# Patient Record
Sex: MALE | Race: WHITE | NOT HISPANIC OR LATINO | Employment: OTHER | URBAN - NONMETROPOLITAN AREA
[De-identification: names, ages, dates, MRNs, and addresses within clinical notes are randomized per-mention and may not be internally consistent; named-entity substitution may affect disease eponyms.]

---

## 2017-03-21 ENCOUNTER — OFFICE VISIT (OUTPATIENT)
Dept: UROLOGY | Facility: CLINIC | Age: 72
End: 2017-03-21

## 2017-03-21 DIAGNOSIS — N42.9 DISORDER OF PROSTATE: ICD-10-CM

## 2017-03-21 DIAGNOSIS — N28.1 RENAL CYST: Primary | ICD-10-CM

## 2017-03-21 PROCEDURE — 99203 OFFICE O/P NEW LOW 30 MIN: CPT | Performed by: UROLOGY

## 2017-03-21 RX ORDER — PANTOPRAZOLE SODIUM 40 MG/1
TABLET, DELAYED RELEASE ORAL
COMMUNITY
Start: 2017-02-27

## 2017-03-21 RX ORDER — NITROGLYCERIN 0.4 MG/1
TABLET SUBLINGUAL
Refills: 2 | COMMUNITY
Start: 2017-01-24

## 2017-03-21 RX ORDER — ALPRAZOLAM 0.5 MG/1
0.5 TABLET ORAL AS NEEDED
Refills: 2 | COMMUNITY
Start: 2017-01-24

## 2017-03-21 RX ORDER — LISINOPRIL AND HYDROCHLOROTHIAZIDE 25; 20 MG/1; MG/1
TABLET ORAL
COMMUNITY
Start: 2017-01-08

## 2017-03-21 RX ORDER — SIMVASTATIN 40 MG
TABLET ORAL
COMMUNITY
Start: 2017-03-17

## 2017-03-21 RX ORDER — GLIPIZIDE 5 MG/1
TABLET ORAL
Refills: 1 | COMMUNITY
Start: 2017-02-21 | End: 2021-04-16

## 2017-03-21 NOTE — PROGRESS NOTES
Chief Complaint:          Chief Complaint   Patient presents with   • Renal Cyst       HPI:   71 y.o. male.    HPI  Pt was referred for renal cyst that has enlarged.  Pt has bilateral cysts.  Pt's creat was 1.3      Past Medical History:        Past Medical History   Diagnosis Date   • CAD (coronary artery disease)    • Chronic kidney disease    • Diabetes mellitus    • Hypertension          Current Meds:     Current Outpatient Prescriptions   Medication Sig Dispense Refill   • ALPRAZolam (XANAX) 0.5 MG tablet   2   • glipiZIDE (GLUCOTROL) 5 MG tablet   1   • lisinopril-hydrochlorothiazide (PRINZIDE,ZESTORETIC) 20-25 MG per tablet      • NITROSTAT 0.4 MG SL tablet   2   • pantoprazole (PROTONIX) 40 MG EC tablet      • simvastatin (ZOCOR) 40 MG tablet        No current facility-administered medications for this visit.         Allergies:      No Known Allergies     Past Surgical History:     Past Surgical History   Procedure Laterality Date   • Shoulder surgery     • Angioplasty     • Appendectomy     • Cholecystectomy           Social History:     Social History     Social History   • Marital status: Unknown     Spouse name: N/A   • Number of children: N/A   • Years of education: N/A     Occupational History   • Not on file.     Social History Main Topics   • Smoking status: Former Smoker   • Smokeless tobacco: Not on file   • Alcohol use No   • Drug use: No   • Sexual activity: Not on file     Other Topics Concern   • Not on file     Social History Narrative   • No narrative on file       Family History:     Family History   Problem Relation Age of Onset   • Heart disease Father    • Hypertension Father    • Cancer Father    • Cancer Mother    • Hypertension Mother    • Heart disease Mother        Review of Systems:     Review of Systems    Physical Exam:     Physical Exam   Constitutional: He is oriented to person, place, and time.   HENT:   Head: Normocephalic and atraumatic.   Right Ear: External ear normal.    Left Ear: External ear normal.   Nose: Nose normal.   Mouth/Throat: Oropharynx is clear and moist.   Eyes: Conjunctivae and EOM are normal. Pupils are equal, round, and reactive to light.   Neck: Normal range of motion. Neck supple. No thyromegaly present.   Cardiovascular: Normal rate, regular rhythm, normal heart sounds and intact distal pulses.    No murmur heard.  Pulmonary/Chest: Effort normal and breath sounds normal. No respiratory distress. He has no wheezes. He has no rales. He exhibits no tenderness.   Abdominal: Soft. Bowel sounds are normal. He exhibits no distension and no mass. There is no tenderness. No hernia.   Genitourinary: Rectum normal, prostate normal and penis normal.   Musculoskeletal: Normal range of motion. He exhibits no edema or tenderness.   Lymphadenopathy:     He has no cervical adenopathy.   Neurological: He is alert and oriented to person, place, and time. No cranial nerve deficit. He exhibits normal muscle tone. Coordination normal.   Skin: Skin is warm. No rash noted.   Psychiatric: He has a normal mood and affect. His behavior is normal. Judgment and thought content normal.   Nursing note and vitals reviewed.      Procedure:     No notes on file      Assessment:     Encounter Diagnoses   Name Primary?   • Renal cyst Yes   • Disorder of prostate      Orders Placed This Encounter   Procedures   • US Renal Bilateral     Standing Status:   Future     Standing Expiration Date:   3/21/2018     Order Specific Question:   Reason for Exam:     Answer:   bilateral renal cysts.   • PSA     Standing Status:   Future     Standing Expiration Date:   3/21/2018       Plan:   I would like the imaging regarding the renal cysts to be done at [facility] because I can watch the size and nature of how the cysts change.    Counseling was given to patient for the following topics impressions. and the interim medical history and current results were reviewed.  A treatment plan with follow-up was made.  Total time of the encounter was 35 minutes and 35 minutes were spent discussing Renal cyst [N28.1] face-to-face.        This document has been electronically signed by Francisco Garcia MD March 21, 2017 4:40 PM

## 2017-04-07 ENCOUNTER — APPOINTMENT (OUTPATIENT)
Dept: ULTRASOUND IMAGING | Facility: HOSPITAL | Age: 72
End: 2017-04-07
Attending: UROLOGY

## 2017-04-10 ENCOUNTER — HOSPITAL ENCOUNTER (OUTPATIENT)
Dept: ULTRASOUND IMAGING | Facility: HOSPITAL | Age: 72
Discharge: HOME | End: 2017-04-10
Attending: UROLOGY | Admitting: UROLOGY

## 2017-04-10 DIAGNOSIS — N28.1 RENAL CYST: ICD-10-CM

## 2017-04-10 PROCEDURE — 76775 US EXAM ABDO BACK WALL LIM: CPT

## 2017-04-10 PROCEDURE — 76770 US EXAM ABDO BACK WALL COMP: CPT | Performed by: RADIOLOGY

## 2019-04-03 ENCOUNTER — OFFICE VISIT (OUTPATIENT)
Dept: UROLOGY | Facility: CLINIC | Age: 74
End: 2019-04-03

## 2019-04-03 VITALS — WEIGHT: 235 LBS | BODY MASS INDEX: 31.14 KG/M2 | HEIGHT: 73 IN

## 2019-04-03 DIAGNOSIS — N28.1 RENAL CYST: ICD-10-CM

## 2019-04-03 DIAGNOSIS — N40.1 BENIGN PROSTATIC HYPERPLASIA WITH WEAK URINARY STREAM: ICD-10-CM

## 2019-04-03 DIAGNOSIS — R35.0 FREQUENCY OF URINATION: Primary | ICD-10-CM

## 2019-04-03 DIAGNOSIS — R39.12 BENIGN PROSTATIC HYPERPLASIA WITH WEAK URINARY STREAM: ICD-10-CM

## 2019-04-03 LAB
BILIRUB BLD-MCNC: ABNORMAL MG/DL
CLARITY, POC: CLEAR
COLOR UR: YELLOW
GLUCOSE UR STRIP-MCNC: ABNORMAL MG/DL
KETONES UR QL: NEGATIVE
LEUKOCYTE EST, POC: NEGATIVE
NITRITE UR-MCNC: NEGATIVE MG/ML
PH UR: 5 [PH] (ref 5–8)
PROT UR STRIP-MCNC: NEGATIVE MG/DL
PSA SERPL-MCNC: 0.89 NG/ML (ref 0–4)
RBC # UR STRIP: NEGATIVE /UL
SP GR UR: 1.03 (ref 1–1.03)
UROBILINOGEN UR QL: NORMAL

## 2019-04-03 PROCEDURE — 99214 OFFICE O/P EST MOD 30 MIN: CPT | Performed by: UROLOGY

## 2019-04-03 PROCEDURE — 84153 ASSAY OF PSA TOTAL: CPT | Performed by: UROLOGY

## 2019-04-03 PROCEDURE — 81003 URINALYSIS AUTO W/O SCOPE: CPT | Performed by: UROLOGY

## 2019-04-03 PROCEDURE — 36415 COLL VENOUS BLD VENIPUNCTURE: CPT | Performed by: UROLOGY

## 2019-04-03 NOTE — PROGRESS NOTES
Chief Complaint:          Renal cysts.    HPI:   73 y.o. male.  Pt was referred back to us because his primary physician has noticed his renal cysts on ultrasound have increased in size.  He does not have any back pain.  He has moderate obstructive urinary symptoms by is urinary symptom questionnaire IPSS score of 12 see chart below.  Pt's psa is normal  HPI      Past Medical History:        Past Medical History:   Diagnosis Date   • CAD (coronary artery disease)    • Chronic kidney disease    • Diabetes mellitus (CMS/HCC)    • Hypertension          Current Meds:     Current Outpatient Medications   Medication Sig Dispense Refill   • ALPRAZolam (XANAX) 0.5 MG tablet   2   • glipiZIDE (GLUCOTROL) 5 MG tablet   1   • lisinopril-hydrochlorothiazide (PRINZIDE,ZESTORETIC) 20-25 MG per tablet      • NITROSTAT 0.4 MG SL tablet   2   • pantoprazole (PROTONIX) 40 MG EC tablet      • simvastatin (ZOCOR) 40 MG tablet        No current facility-administered medications for this visit.         Allergies:      No Known Allergies     Past Surgical History:     Past Surgical History:   Procedure Laterality Date   • ANGIOPLASTY     • APPENDECTOMY     • CHOLECYSTECTOMY     • SHOULDER SURGERY           Social History:     Social History     Socioeconomic History   • Marital status: Unknown     Spouse name: Not on file   • Number of children: Not on file   • Years of education: Not on file   • Highest education level: Not on file   Tobacco Use   • Smoking status: Former Smoker   • Smokeless tobacco: Never Used   Substance and Sexual Activity   • Alcohol use: No   • Drug use: No       Family History:     Family History   Problem Relation Age of Onset   • Heart disease Father    • Hypertension Father    • Cancer Father    • Cancer Mother    • Hypertension Mother    • Heart disease Mother        Review of Systems:     Review of Systems   Constitutional: Positive for fatigue.   HENT: Negative for sinus pressure and sinus pain.    Eyes:  Negative for pain and redness.   Respiratory: Negative for chest tightness.    Cardiovascular: Negative for chest pain.   Gastrointestinal: Negative for abdominal pain, constipation and nausea.   Endocrine: Negative for heat intolerance.   Genitourinary: Negative for frequency, penile pain and testicular pain.   Musculoskeletal: Negative for back pain.   Skin: Negative for rash.   Allergic/Immunologic: Negative for food allergies.   Neurological: Negative for headaches.   Hematological: Bruises/bleeds easily.   Psychiatric/Behavioral: The patient is not nervous/anxious.        IPSS Questionnaire (AUA-7):  Over the past month…    1)  Incomplete Emptying  How often have you had a sensation of not emptying your bladder?  3 - About half the time   2)  Frequency  How often have you had to urinate less than every two hours? 0 - Not at all   3)  Intermittency  How often have you found you stopped and started again several times when you urinated?  4 - More than half the time   4) Urgency  How often have you found it difficult to postpone urination?  2 - Less than half the time   5) Weak Stream  How often have you had a weak urinary stream?  2 - Less than half the time   6) Straining  How often have you had to push or strain to begin urination?  0 - Not at all   7) Nocturia  How many times did you typically get up at night to urinate?  1 - 1 time   Total Score:  12       Quality of life due to urinary symptoms:  If you were to spend the rest of your life with your urinary condition the way it is now, how would you feel about that? 2-Mostly Satisfied   Urine Leakage (Incontinence) 1-Mild (A few drops a day, no pad use)       I have reviewed the follow portions of the patient's history and confirmed they are accurate today:  allergies, current medications, past family history, past medical history, past social history, past surgical history, problem list and ROS  Physical Exam:     Physical Exam   Constitutional: He is  "oriented to person, place, and time.   HENT:   Head: Normocephalic and atraumatic.   Right Ear: External ear normal.   Left Ear: External ear normal.   Nose: Nose normal.   Mouth/Throat: Oropharynx is clear and moist.   Eyes: Conjunctivae and EOM are normal. Pupils are equal, round, and reactive to light.   Neck: Normal range of motion. Neck supple. No thyromegaly present.   Cardiovascular: Normal rate, regular rhythm, normal heart sounds and intact distal pulses.   No murmur heard.  Pulmonary/Chest: Effort normal and breath sounds normal. No respiratory distress. He has no wheezes. He has no rales. He exhibits no tenderness.   Abdominal: Soft. Bowel sounds are normal. He exhibits no distension and no mass. There is no tenderness. No hernia.   Genitourinary: Rectum normal, prostate normal and penis normal.   Musculoskeletal: Normal range of motion. He exhibits no edema or tenderness.   Lymphadenopathy:     He has no cervical adenopathy.   Neurological: He is alert and oriented to person, place, and time. No cranial nerve deficit. He exhibits normal muscle tone. Coordination normal.   Skin: Skin is warm. No rash noted.   Psychiatric: He has a normal mood and affect. His behavior is normal. Judgment and thought content normal.   Nursing note and vitals reviewed.      Ht 185.4 cm (73\")   Wt 107 kg (235 lb)   BMI 31.00 kg/m²    Procedure:         Assessment:     Encounter Diagnoses   Name Primary?   • Frequency of urination Yes   • Benign prostatic hyperplasia with weak urinary stream    • Renal cyst      Orders Placed This Encounter   Procedures   • CT Abdomen With & Without Contrast     Standing Status:   Future     Standing Expiration Date:   4/2/2020     Scheduling Instructions:      Abdomen Only!     Order Specific Question:   Will Oral Contrast be needed for this procedure?     Answer:   No   • PSA DIAGNOSTIC     Standing Status:   Future     Standing Expiration Date:   4/3/2022   • POC Urinalysis Dipstick, " Automated       Plan:   Pt has moderate obstructive symptoms of BPH will check a psa  He has increasing size of his renal cysts on ultrasound so we will check a ct scan with and without contrast.  Will see him back in a month.    Patient's Body mass index is 31 kg/m². BMI is above normal parameters. Recommendations include: educational material.      Counseling was given to patient for the following topics impressions as follows: renal cyst and bph. The interim medical history and current results were reviewed.  A treatment plan with follow-up was made for Frequency of urination [R35.0].  This document has been electronically signed by Francisco Garcia MD April 3, 2019 10:28 AM

## 2019-05-21 ENCOUNTER — HOSPITAL ENCOUNTER (OUTPATIENT)
Dept: CT IMAGING | Facility: HOSPITAL | Age: 74
Discharge: HOME | End: 2019-05-21
Admitting: UROLOGY

## 2019-05-21 DIAGNOSIS — N28.1 RENAL CYST: ICD-10-CM

## 2019-05-21 PROCEDURE — 74150 CT ABDOMEN W/O CONTRAST: CPT | Performed by: RADIOLOGY

## 2019-05-21 PROCEDURE — 74150 CT ABDOMEN W/O CONTRAST: CPT

## 2019-09-24 ENCOUNTER — OFFICE VISIT (OUTPATIENT)
Dept: UROLOGY | Facility: CLINIC | Age: 74
End: 2019-09-24

## 2019-09-24 VITALS
HEIGHT: 73 IN | BODY MASS INDEX: 32.02 KG/M2 | WEIGHT: 241.6 LBS | SYSTOLIC BLOOD PRESSURE: 140 MMHG | DIASTOLIC BLOOD PRESSURE: 82 MMHG

## 2019-09-24 DIAGNOSIS — N28.1 RENAL CYST: Primary | ICD-10-CM

## 2019-09-24 PROCEDURE — 99213 OFFICE O/P EST LOW 20 MIN: CPT | Performed by: UROLOGY

## 2019-09-24 NOTE — PROGRESS NOTES
Chief Complaint:          Renal cyst.    HPI:   74 y.o. male.  Pt has normal renal function with creat of 0,85  Pt when given the option of not having the contrast but he did not understand that enhancement was key to evaluating the cyst.  HPI      Past Medical History:        Past Medical History:   Diagnosis Date   • CAD (coronary artery disease)    • Chronic kidney disease    • Diabetes mellitus (CMS/HCC)    • Hypertension          Current Meds:     Current Outpatient Medications   Medication Sig Dispense Refill   • ALPRAZolam (XANAX) 0.5 MG tablet   2   • glipiZIDE (GLUCOTROL) 5 MG tablet   1   • lisinopril-hydrochlorothiazide (PRINZIDE,ZESTORETIC) 20-25 MG per tablet      • NITROSTAT 0.4 MG SL tablet   2   • pantoprazole (PROTONIX) 40 MG EC tablet      • simvastatin (ZOCOR) 40 MG tablet        No current facility-administered medications for this visit.         Allergies:      No Known Allergies     Past Surgical History:     Past Surgical History:   Procedure Laterality Date   • ANGIOPLASTY     • APPENDECTOMY     • CHOLECYSTECTOMY     • SHOULDER SURGERY           Social History:     Social History     Socioeconomic History   • Marital status: Unknown     Spouse name: Not on file   • Number of children: Not on file   • Years of education: Not on file   • Highest education level: Not on file   Tobacco Use   • Smoking status: Former Smoker   • Smokeless tobacco: Never Used   Substance and Sexual Activity   • Alcohol use: No   • Drug use: No       Family History:     Family History   Problem Relation Age of Onset   • Heart disease Father    • Hypertension Father    • Cancer Father    • Cancer Mother    • Hypertension Mother    • Heart disease Mother        Review of Systems:     Review of Systems   Constitutional: Positive for fatigue.   HENT: Negative for sinus pressure and sinus pain.    Eyes: Negative for pain and redness.   Respiratory: Negative for chest tightness.    Cardiovascular: Negative for chest pain.  "  Gastrointestinal: Positive for diarrhea. Negative for abdominal pain and constipation.   Endocrine: Negative for heat intolerance.   Genitourinary: Negative for dysuria, frequency and hematuria.   Musculoskeletal: Positive for back pain.   Skin: Negative for rash.   Allergic/Immunologic: Negative for food allergies.   Neurological: Negative for headaches.   Hematological: Bruises/bleeds easily.   Psychiatric/Behavioral: The patient is not nervous/anxious.        IPSS Questionnaire (AUA-7):  Over the past month…    1)  Incomplete Emptying  How often have you had a sensation of not emptying your bladder?  0 - Not at all   2)  Frequency  How often have you had to urinate less than every two hours? 1 - Less than 1 time in 5   3)  Intermittency  How often have you found you stopped and started again several times when you urinated?  1 - Less than 1 time in 5   4) Urgency  How often have you found it difficult to postpone urination?  3 - About half the time   5) Weak Stream  How often have you had a weak urinary stream?  0 - Not at all   6) Straining  How often have you had to push or strain to begin urination?  0 - Not at all   7) Nocturia  How many times did you typically get up at night to urinate?  1 - 1 time   Total Score:  6       Quality of life due to urinary symptoms:  If you were to spend the rest of your life with your urinary condition the way it is now, how would you feel about that? 2-Mostly Satisfied   Urine Leakage (Incontinence) 1-Mild (A few drops a day, no pad use)       I have reviewed the follow portions of the patient's history and confirmed they are accurate today:  allergies, current medications, past family history, past medical history, past social history, past surgical history, problem list and ROS  Physical Exam:     Physical Exam    /82 (BP Location: Left arm, Patient Position: Sitting, Cuff Size: Adult)   Ht 185.4 cm (73\")   Wt 110 kg (241 lb 9.6 oz)   BMI 31.88 kg/m²  "   Procedure:         Assessment:     Encounter Diagnosis   Name Primary?   • Renal cyst Yes       No orders of the defined types were placed in this encounter.      Patient reports that he is not currently experiencing any symptoms of urinary incontinence.      Plan:   Will have pt get renal mass protocol ct in six months.    Patient's Body mass index is 31.88 kg/m². BMI is above normal parameters. Recommendations include: educational material.      Smoking Cessation Counseling:  Former smoker.  Patient does not currently use any tobacco products.    Quit 1989    Counseling was given to patient for the following topics diagnostic results including: ct scan. The interim medical history and current results were reviewed.  A treatment plan with follow-up was made for Renal cyst [N28.1].   This document has been electronically signed by Francisco Garcia MD September 24, 2019 12:28 PM

## 2020-01-14 ENCOUNTER — HOSPITAL ENCOUNTER (OUTPATIENT)
Dept: GENERAL RADIOLOGY | Facility: HOSPITAL | Age: 75
Discharge: HOME | End: 2020-01-14
Admitting: FAMILY MEDICINE

## 2020-01-14 DIAGNOSIS — M54.50 LUMBAR PAIN: ICD-10-CM

## 2020-01-14 PROCEDURE — 72110 X-RAY EXAM L-2 SPINE 4/>VWS: CPT

## 2020-01-31 ENCOUNTER — TRANSCRIBE ORDERS (OUTPATIENT)
Dept: ADMINISTRATIVE | Facility: HOSPITAL | Age: 75
End: 2020-01-31

## 2020-01-31 DIAGNOSIS — M51.36 DEGENERATION, INTERVERTEBRAL DISC, LUMBAR: Primary | ICD-10-CM

## 2020-02-04 ENCOUNTER — HOSPITAL ENCOUNTER (OUTPATIENT)
Dept: MRI IMAGING | Facility: HOSPITAL | Age: 75
Discharge: HOME | End: 2020-02-04
Admitting: FAMILY MEDICINE

## 2020-02-04 DIAGNOSIS — M51.36 DEGENERATION, INTERVERTEBRAL DISC, LUMBAR: ICD-10-CM

## 2020-02-04 PROCEDURE — 72148 MRI LUMBAR SPINE W/O DYE: CPT | Performed by: RADIOLOGY

## 2020-02-04 PROCEDURE — 72148 MRI LUMBAR SPINE W/O DYE: CPT

## 2020-03-18 ENCOUNTER — HOSPITAL ENCOUNTER (OUTPATIENT)
Dept: CT IMAGING | Facility: HOSPITAL | Age: 75
Discharge: HOME | End: 2020-03-18
Admitting: UROLOGY

## 2020-03-18 DIAGNOSIS — N28.1 RENAL CYST: ICD-10-CM

## 2020-03-18 LAB — CREAT BLDA-MCNC: 1.4 MG/DL (ref 0.6–1.3)

## 2020-03-18 PROCEDURE — 74178 CT ABD&PLV WO CNTR FLWD CNTR: CPT | Performed by: RADIOLOGY

## 2020-03-18 PROCEDURE — 82565 ASSAY OF CREATININE: CPT

## 2020-03-18 PROCEDURE — 74178 CT ABD&PLV WO CNTR FLWD CNTR: CPT

## 2020-03-18 PROCEDURE — 0 IOVERSOL 68 % SOLUTION: Performed by: UROLOGY

## 2020-03-18 RX ADMIN — IOVERSOL 50 ML: 678 INJECTION INTRA-ARTERIAL; INTRAVENOUS at 08:46

## 2021-02-23 ENCOUNTER — OFFICE VISIT (OUTPATIENT)
Dept: UROLOGY | Facility: CLINIC | Age: 76
End: 2021-02-23

## 2021-02-23 VITALS — WEIGHT: 241 LBS | TEMPERATURE: 97.4 F | BODY MASS INDEX: 36.53 KG/M2 | HEIGHT: 68 IN

## 2021-02-23 DIAGNOSIS — N28.1 COMPLEX RENAL CYST: Primary | ICD-10-CM

## 2021-02-23 DIAGNOSIS — N40.1 BENIGN PROSTATIC HYPERPLASIA WITH LOWER URINARY TRACT SYMPTOMS, SYMPTOM DETAILS UNSPECIFIED: ICD-10-CM

## 2021-02-23 LAB — PSA SERPL-MCNC: 0.98 NG/ML (ref 0–4)

## 2021-02-23 PROCEDURE — 36415 COLL VENOUS BLD VENIPUNCTURE: CPT | Performed by: UROLOGY

## 2021-02-23 PROCEDURE — 84153 ASSAY OF PSA TOTAL: CPT | Performed by: UROLOGY

## 2021-02-23 PROCEDURE — 99214 OFFICE O/P EST MOD 30 MIN: CPT | Performed by: UROLOGY

## 2021-02-23 NOTE — PROGRESS NOTES
Chief Complaint:          Renal cyst.    HPI:   75 y.o. male.  Pt has normal renal function and was referred for evaluation of renal cyst.  His last study did not have contrast because he did not understand the function of it.  I recommended that he have a ct scan renal mass protocol within 6 months.  HPI      Past Medical History:        Past Medical History:   Diagnosis Date   • CAD (coronary artery disease)    • Chronic kidney disease    • Diabetes mellitus (CMS/HCC)    • Hypertension          Current Meds:     Current Outpatient Medications   Medication Sig Dispense Refill   • ALPRAZolam (XANAX) 0.5 MG tablet   2   • glipiZIDE (GLUCOTROL) 5 MG tablet   1   • lisinopril-hydrochlorothiazide (PRINZIDE,ZESTORETIC) 20-25 MG per tablet      • NITROSTAT 0.4 MG SL tablet   2   • pantoprazole (PROTONIX) 40 MG EC tablet      • simvastatin (ZOCOR) 40 MG tablet        No current facility-administered medications for this visit.         Allergies:      No Known Allergies     Past Surgical History:     Past Surgical History:   Procedure Laterality Date   • ANGIOPLASTY     • APPENDECTOMY     • CHOLECYSTECTOMY     • SHOULDER SURGERY           Social History:     Social History     Socioeconomic History   • Marital status:      Spouse name: Not on file   • Number of children: Not on file   • Years of education: Not on file   • Highest education level: Not on file   Tobacco Use   • Smoking status: Former Smoker   • Smokeless tobacco: Never Used   Substance and Sexual Activity   • Alcohol use: No   • Drug use: No       Family History:     Family History   Problem Relation Age of Onset   • Heart disease Father    • Hypertension Father    • Cancer Father    • Cancer Mother    • Hypertension Mother    • Heart disease Mother        Review of Systems:     Review of Systems   HENT: Negative for facial swelling and sinus pain.    Respiratory: Negative for choking and stridor.    Gastrointestinal: Negative for constipation.    Genitourinary: Negative for frequency and penile swelling.   Musculoskeletal: Negative for gait problem.   Neurological: Negative for numbness.   Psychiatric/Behavioral: Negative for agitation and decreased concentration.       IPSS Questionnaire (AUA-7):  Over the past month…    1)  Incomplete Emptying  How often have you had a sensation of not emptying your bladder?  0 - Not at all   2)  Frequency  How often have you had to urinate less than every two hours? 1 - Less than 1 time in 5   3)  Intermittency  How often have you found you stopped and started again several times when you urinated?  1 - Less than 1 time in 5   4) Urgency  How often have you found it difficult to postpone urination?  3 - About half the time   5) Weak Stream  How often have you had a weak urinary stream?  0 - Not at all   6) Straining  How often have you had to push or strain to begin urination?  0 - Not at all   7) Nocturia  How many times did you typically get up at night to urinate?  1 - 1 time   Total Score:  6       Quality of life due to urinary symptoms:  If you were to spend the rest of your life with your urinary condition the way it is now, how would you feel about that? 2-Mostly Satisfied   Urine Leakage (Incontinence) 1-Mild (A few drops a day, no pad use)       I have reviewed the follow portions of the patient's history and confirmed they are accurate today:  allergies, current medications, past family history, past medical history, past social history, past surgical history, problem list and ROS  Physical Exam:     Physical Exam  Vitals signs and nursing note reviewed.   HENT:      Head: Normocephalic and atraumatic.      Right Ear: External ear normal.      Left Ear: External ear normal.      Nose: Nose normal.   Eyes:      Conjunctiva/sclera: Conjunctivae normal.      Pupils: Pupils are equal, round, and reactive to light.   Neck:      Musculoskeletal: Normal range of motion and neck supple.      Thyroid: No thyromegaly.  "  Cardiovascular:      Rate and Rhythm: Normal rate and regular rhythm.      Heart sounds: Normal heart sounds. No murmur.   Pulmonary:      Effort: Pulmonary effort is normal. No respiratory distress.      Breath sounds: Normal breath sounds. No wheezing or rales.   Chest:      Chest wall: No tenderness.   Abdominal:      General: Bowel sounds are normal. There is no distension.      Palpations: Abdomen is soft. There is no mass.      Tenderness: There is no abdominal tenderness.      Hernia: No hernia is present.   Genitourinary:     Penis: Normal.       Prostate: Normal.      Rectum: Normal.      Comments: No nodules  Musculoskeletal: Normal range of motion.         General: No tenderness.   Lymphadenopathy:      Cervical: No cervical adenopathy.   Skin:     General: Skin is warm.      Findings: No rash.   Neurological:      Mental Status: He is alert and oriented to person, place, and time.      Cranial Nerves: No cranial nerve deficit.      Motor: No abnormal muscle tone.      Coordination: Coordination normal.   Psychiatric:         Behavior: Behavior normal.         Thought Content: Thought content normal.         Judgment: Judgment normal.         Temp 97.4 °F (36.3 °C)   Ht 172.7 cm (68\")   Wt 109 kg (241 lb)   BMI 36.64 kg/m²    Procedure:         Assessment:     Encounter Diagnoses   Name Primary?   • Complex renal cyst Yes   • Benign prostatic hyperplasia with lower urinary tract symptoms, symptom details unspecified        No orders of the defined types were placed in this encounter.      Patient reports that he is not currently experiencing any symptoms of urinary incontinence.      Plan:   Will check a psa today and will order ct scan renal mass protocol.    Patient's Body mass index is 36.64 kg/m². BMI is above normal parameters. Recommendations include: referral to primary care.      Smoking Cessation Counseling:  Former smoker.  Patient does not currently use any tobacco products.   Counseling " was given to patient for the following topics impressions as follows: complex renal cyst.. The interim medical history and current results were reviewed.  A treatment plan with follow-up was made for Complex renal cyst [N28.1]. I spent 31 minutes face to face with Simone Quach and 80 percentage was spent in counseling.       This document has been electronically signed by Francisco Garcia MD February 23, 2021 08:46 EST

## 2021-04-16 ENCOUNTER — OFFICE VISIT (OUTPATIENT)
Dept: NEUROSURGERY | Facility: CLINIC | Age: 76
End: 2021-04-16

## 2021-04-16 VITALS
TEMPERATURE: 97.3 F | SYSTOLIC BLOOD PRESSURE: 155 MMHG | BODY MASS INDEX: 31.04 KG/M2 | OXYGEN SATURATION: 98 % | WEIGHT: 234.2 LBS | HEIGHT: 73 IN | HEART RATE: 68 BPM | DIASTOLIC BLOOD PRESSURE: 75 MMHG

## 2021-04-16 DIAGNOSIS — M51.36 DEGENERATIVE DISC DISEASE, LUMBAR: Primary | ICD-10-CM

## 2021-04-16 PROCEDURE — 99203 OFFICE O/P NEW LOW 30 MIN: CPT | Performed by: NEUROLOGICAL SURGERY

## 2021-04-16 RX ORDER — HUMAN INSULIN 100 [USP'U]/ML
INJECTION, SUSPENSION SUBCUTANEOUS
COMMUNITY

## 2021-04-16 RX ORDER — METHOCARBAMOL 750 MG/1
750 TABLET, FILM COATED ORAL NIGHTLY
Qty: 30 TABLET | Refills: 0 | Status: SHIPPED | OUTPATIENT
Start: 2021-04-16 | End: 2021-05-16

## 2021-04-16 RX ORDER — CLOPIDOGREL BISULFATE 75 MG/1
75 TABLET ORAL DAILY
COMMUNITY

## 2021-04-16 RX ORDER — METFORMIN HYDROCHLORIDE 750 MG/1
750 TABLET, EXTENDED RELEASE ORAL 2 TIMES DAILY
COMMUNITY

## 2021-04-16 NOTE — PROGRESS NOTES
Simone Quach  1945  [id number]      Chief Complaint   Patient presents with   • Back Pain     Low back pain       HISTORY OF PRESENT ILLNESS: This is a 75-year-old male presenting with pain both in the cervical and lumbar area.  Primarily axial, nonradicular.  He has had lumbar MRI and referred for neurosurgical consultation.  He has restricted range of motion in both the cervical lumbar area.  Pain is worst with bending twisting and sitting for prolonged periods of time.    Diabetes and CKD.  The latter precludes the use of NSAIDs.    Past Medical History:   Diagnosis Date   • Back problem    • CAD (coronary artery disease)    • Chronic kidney disease    • Diabetes mellitus (CMS/HCC)    • Hypertension    • Myocardial infarct (CMS/HCC)        Past Surgical History:   Procedure Laterality Date   • ANGIOPLASTY     • APPENDECTOMY     • CHOLECYSTECTOMY     • CHOLECYSTECTOMY     • SHOULDER SURGERY         Family History   Problem Relation Age of Onset   • Heart disease Father    • Hypertension Father    • Cancer Father    • Hypertension Mother    • Heart disease Mother    • Cancer Sister    • Cancer Brother        Social History     Socioeconomic History   • Marital status:      Spouse name: Not on file   • Number of children: Not on file   • Years of education: Not on file   • Highest education level: Not on file   Tobacco Use   • Smoking status: Former Smoker     Types: Cigarettes     Quit date:      Years since quittin.3   • Smokeless tobacco: Never Used   Vaping Use   • Vaping Use: Never used   Substance and Sexual Activity   • Alcohol use: No   • Drug use: No   • Sexual activity: Defer       No Known Allergies      Current Outpatient Medications:   •  ALPRAZolam (XANAX) 0.5 MG tablet, Take 0.5 mg by mouth As Needed., Disp: , Rfl: 2  •  clopidogrel (PLAVIX) 75 MG tablet, Take 75 mg by mouth Daily., Disp: , Rfl:   •  insulin NPH-insulin regular (NovoLIN 70/30) (70-30) 100 UNIT/ML injection,  Inject  under the skin into the appropriate area as directed., Disp: , Rfl:   •  lisinopril-hydrochlorothiazide (PRINZIDE,ZESTORETIC) 20-25 MG per tablet, , Disp: , Rfl:   •  metFORMIN ER (GLUCOPHAGE-XR) 750 MG 24 hr tablet, Take 750 mg by mouth 2 (two) times a day., Disp: , Rfl:   •  NITROSTAT 0.4 MG SL tablet, , Disp: , Rfl: 2  •  pantoprazole (PROTONIX) 40 MG EC tablet, , Disp: , Rfl:   •  simvastatin (ZOCOR) 40 MG tablet, , Disp: , Rfl:   •  VITAMIN D PO, Take 2 mg by mouth Daily., Disp: , Rfl:     Review of Systems   Constitutional: Negative for activity change, appetite change, chills, diaphoresis, fatigue, fever and unexpected weight change.   HENT: Negative for congestion, dental problem, drooling, ear discharge, ear pain, facial swelling, hearing loss, mouth sores, nosebleeds, postnasal drip, rhinorrhea, sinus pressure, sinus pain, sneezing, sore throat, tinnitus, trouble swallowing and voice change.    Eyes: Positive for itching. Negative for photophobia, pain, discharge, redness and visual disturbance.   Respiratory: Negative for apnea, cough, choking, chest tightness, shortness of breath, wheezing and stridor.    Cardiovascular: Positive for leg swelling. Negative for chest pain and palpitations.   Gastrointestinal: Positive for diarrhea. Negative for abdominal distention, abdominal pain, anal bleeding, blood in stool, constipation, nausea, rectal pain and vomiting.   Endocrine: Negative for cold intolerance, heat intolerance, polydipsia, polyphagia and polyuria.   Genitourinary: Negative for decreased urine volume, difficulty urinating, dysuria, enuresis, flank pain, frequency, genital sores, hematuria and urgency.   Musculoskeletal: Positive for back pain. Negative for arthralgias, gait problem, joint swelling, myalgias, neck pain and neck stiffness.   Skin: Negative for color change, pallor, rash and wound.   Allergic/Immunologic: Positive for environmental allergies. Negative for food allergies and  "immunocompromised state.   Neurological: Negative for dizziness, tremors, seizures, syncope, facial asymmetry, speech difficulty, weakness, light-headedness, numbness and headaches.   Hematological: Negative for adenopathy. Does not bruise/bleed easily.   Psychiatric/Behavioral: Negative for agitation, behavioral problems, confusion, decreased concentration, dysphoric mood, hallucinations, self-injury, sleep disturbance and suicidal ideas. The patient is not nervous/anxious and is not hyperactive.    All other systems reviewed and are negative.      Vitals:    04/16/21 1108   BP: 155/75   BP Location: Left arm   Patient Position: Sitting   Cuff Size: Adult   Pulse: 68   Temp: 97.3 °F (36.3 °C)   SpO2: 98%   Weight: 106 kg (234 lb 3.2 oz)   Height: 185.4 cm (73\")       Neurological Examination:    Mental status/speech: The patient is alert and oriented.  Speech is clear without aphysia or dysarthria.  No overt cognitive deficits.    Cranial nerve examination:    Olfaction: Smell is intact.  Vision: Vision is intact without visual field abnormalities.  Funduscopic examination is normal.  No pupillary irregularity.  Ocular motor examination: The extraocular muscles are intact.  There is no diplopia.  The pupil is round and reactive to both light and accommodation.  There is no nystagmus.  Facial movement/sensation: There is no facial weakness.  Sensation is intact in the first, second, and third divisions of the trigeminal nerve.  The corneal reflex is intact.  Auditory: Hearing is intact to finger rub bilaterally.  Cranial nerves IX, X, XI, XII: Phonation is normal.  No dysphagia.  Tongue is protruded in the midline without atrophy.  The gag reflex is intact.  Shoulder shrug is normal.    Musculoligamentous ligamentous examination: BMI 30.9, weight 234.  Restricted range of motion of cervical lumbar area.  There is no weakness, sensory loss or reflex asymmetry.  Gait is normal    Medical Decision " Making:     Diagnostic Data Set: Lumbar MRI shows multilevel degenerative disc disease without focal protrusion, spinal or foraminal stenosis      Assessment: Degenerative osteoarthritis          Recommendations: Surgery will not provide a remedy.  Unfortunately the use of NSAIDs are precluded because of his renal disease.  I have given him a prescription of Robaxin-750 milligrams at bedtime; referred to physical therapy and will ask him to call me in 2 weeks.  At that time if he is not better I would recommend epidural steroid injection or facet block.  I will keep you informed thank you for allow me to see him.        I greatly appreciate the opportunity to see and evaluate this individual.  If you have questions or concerns regarding issues that I may have overlooked please call me at any time: [phone].  Gerhard Chacon M.D.  [facility]  [address] [place] [address].  [facility]  [id number]

## 2021-04-16 NOTE — PATIENT INSTRUCTIONS
Call Dr. Chacon on a Monday or Tuesday (ask for Amena or Ariadne) and leave a message.     Dr. Chacon will call you back at the end of the day as soon as he can.     [phone] Amena    [phone] Ariadne Strickland    Call 2 weeks

## 2021-08-09 ENCOUNTER — OFFICE VISIT (OUTPATIENT)
Dept: SURGERY | Facility: CLINIC | Age: 76
End: 2021-08-09

## 2021-08-09 VITALS
HEIGHT: 73 IN | WEIGHT: 234.2 LBS | BODY MASS INDEX: 31.04 KG/M2 | DIASTOLIC BLOOD PRESSURE: 78 MMHG | SYSTOLIC BLOOD PRESSURE: 154 MMHG | HEART RATE: 76 BPM

## 2021-08-09 DIAGNOSIS — K61.1 PERIRECTAL ABSCESS: Primary | ICD-10-CM

## 2021-08-09 DIAGNOSIS — L02.214 GROIN ABSCESS: Primary | ICD-10-CM

## 2021-08-09 PROCEDURE — 87205 SMEAR GRAM STAIN: CPT | Performed by: SURGERY

## 2021-08-09 PROCEDURE — 87070 CULTURE OTHR SPECIMN AEROBIC: CPT | Performed by: SURGERY

## 2021-08-09 PROCEDURE — 99203 OFFICE O/P NEW LOW 30 MIN: CPT | Performed by: SURGERY

## 2021-08-09 PROCEDURE — 46050 I&D PERIANAL ABSCESS SUPFC: CPT | Performed by: SURGERY

## 2021-08-09 RX ORDER — AMOXICILLIN AND CLAVULANATE POTASSIUM 875; 125 MG/1; MG/1
1 TABLET, FILM COATED ORAL 2 TIMES DAILY
Qty: 20 TABLET | Refills: 0 | Status: SHIPPED | OUTPATIENT
Start: 2021-08-09 | End: 2021-08-19

## 2021-08-09 NOTE — PROGRESS NOTES
"Subjective   Simone Quach is a 76 y.o. male here for abscess in groin area.    History of Present Illness  Mr. Quach was seen in the office today for recurrent perirectal abscess.  He states that this is the second time it developed in the last was a month and a half ago.  It drained spontaneously but he never took antibiotics.  Most recently it came out 2 days ago.  He saw his primary care provider and was referred for further evaluation.  Area is currently draining.  Patient denies fever or chills..  Patient is on Plavix and aspirin  No Known Allergies  Current Outpatient Medications   Medication Sig Dispense Refill   • ALPRAZolam (XANAX) 0.5 MG tablet Take 0.5 mg by mouth As Needed.  2   • clopidogrel (PLAVIX) 75 MG tablet Take 75 mg by mouth Daily.     • insulin NPH-insulin regular (NovoLIN 70/30) (70-30) 100 UNIT/ML injection Inject  under the skin into the appropriate area as directed.     • lisinopril-hydrochlorothiazide (PRINZIDE,ZESTORETIC) 20-25 MG per tablet      • metFORMIN ER (GLUCOPHAGE-XR) 750 MG 24 hr tablet Take 750 mg by mouth 2 (two) times a day.     • NITROSTAT 0.4 MG SL tablet   2   • pantoprazole (PROTONIX) 40 MG EC tablet      • simvastatin (ZOCOR) 40 MG tablet      • VITAMIN D PO Take 2 mg by mouth Daily.       No current facility-administered medications for this visit.     Past Medical History:   Diagnosis Date   • Back problem    • CAD (coronary artery disease)    • Chronic kidney disease    • Diabetes mellitus (CMS/HCC)    • Hypertension    • Myocardial infarct (CMS/HCC)      Past Surgical History:   Procedure Laterality Date   • ANGIOPLASTY     • APPENDECTOMY     • CHOLECYSTECTOMY     • CHOLECYSTECTOMY     • SHOULDER SURGERY         Pertinent Review of Systems:  Respiratory: no shortness of breath  Cardiovascular: no chest pain  Other pertinent:      Objective   /78 (BP Location: Left arm)   Pulse 76   Ht 185.4 cm (73\")   Wt 106 kg (234 lb 3.2 oz)   BMI 30.90 kg/m² "   Physical Exam  Developed well-nourished male no acute distress  Rectum: Drainage, induration, swelling in the 12:00 perineal area consistent with perianal/perirectal abscess.    Procedures   Procedure Note    Procedure: Incision and drainage perirectal abscess    Location: 12:00 rectal area    Anesthesia: 1% lidocaine with epinephrine    Description:  The risks and benefits of the procedure were discussed.  After prep with Betadine and infiltration with lidocaine a 1 cm incision was made overlying the area of induration and carried down into the abscess cavity.  Abscess cavity was fairly large.  This was irrigated with peroxide and packed with quarter inch Nu Gauze.  Culture was done    Complications:  none    Follow-up: As needed  Results/Data:  Imaging:   Notes:   Lab:   Other:     Assessment/Plan   Perirectal abscess, recurrent    Plan: Patient given instructions on packing removal  Track culture  Antibiotics         Discussion/Summary: The I&D was limited due to the patient being on blood thinners.  He was advised that this may not be a definitive solution to the problem and if this recurs he will require a surgical procedure under anesthesia    Time spent:     Patient's Body mass index is 30.9 kg/m². indicating that he is overweight (BMI 25-29.9). Obesity-related health conditions include the following: diabetes mellitus. Obesity is newly identified. BMI is is above average; BMI management plan is completed. We discussed portion control..       No future appointments.      Please note that portions of this note were completed with a voice recognition program.

## 2021-08-12 LAB
BACTERIA SPEC AEROBE CULT: NORMAL
GRAM STN SPEC: NORMAL

## 2021-11-12 ENCOUNTER — IMMUNIZATION (OUTPATIENT)
Dept: VACCINE CLINIC | Facility: HOSPITAL | Age: 76
End: 2021-11-12

## 2021-11-12 PROCEDURE — 91300 HC SARSCOV02 VAC 30MCG/0.3ML IM: CPT | Performed by: INTERNAL MEDICINE

## 2021-11-12 PROCEDURE — 0004A ADM SARSCOV2 30MCG/0.3ML BOOSTER: CPT | Performed by: INTERNAL MEDICINE

## 2022-04-13 ENCOUNTER — HOSPITAL ENCOUNTER (OUTPATIENT)
Dept: GENERAL RADIOLOGY | Facility: HOSPITAL | Age: 77
Discharge: HOME | End: 2022-04-13
Admitting: FAMILY MEDICINE

## 2022-04-13 ENCOUNTER — TRANSCRIBE ORDERS (OUTPATIENT)
Dept: ADMINISTRATIVE | Facility: HOSPITAL | Age: 77
End: 2022-04-13

## 2022-04-13 DIAGNOSIS — M62.838 SPASM OF MUSCLE: Primary | ICD-10-CM

## 2022-04-13 DIAGNOSIS — M62.838 SPASM OF MUSCLE: ICD-10-CM

## 2022-04-13 PROCEDURE — 72050 X-RAY EXAM NECK SPINE 4/5VWS: CPT | Performed by: RADIOLOGY

## 2022-04-13 PROCEDURE — 72050 X-RAY EXAM NECK SPINE 4/5VWS: CPT
